# Patient Record
Sex: FEMALE | Race: WHITE | NOT HISPANIC OR LATINO
[De-identification: names, ages, dates, MRNs, and addresses within clinical notes are randomized per-mention and may not be internally consistent; named-entity substitution may affect disease eponyms.]

---

## 2022-07-11 ENCOUNTER — APPOINTMENT (OUTPATIENT)
Dept: ORTHOPEDIC SURGERY | Facility: CLINIC | Age: 87
End: 2022-07-11

## 2022-07-11 VITALS — BODY MASS INDEX: 20.32 KG/M2 | HEIGHT: 72 IN | WEIGHT: 150 LBS

## 2022-07-11 DIAGNOSIS — Z78.9 OTHER SPECIFIED HEALTH STATUS: ICD-10-CM

## 2022-07-11 DIAGNOSIS — Z96.651 PRESENCE OF RIGHT ARTIFICIAL KNEE JOINT: ICD-10-CM

## 2022-07-11 DIAGNOSIS — Z85.3 PERSONAL HISTORY OF MALIGNANT NEOPLASM OF BREAST: ICD-10-CM

## 2022-07-11 DIAGNOSIS — M25.561 PAIN IN RIGHT KNEE: ICD-10-CM

## 2022-07-11 PROBLEM — Z00.00 ENCOUNTER FOR PREVENTIVE HEALTH EXAMINATION: Status: ACTIVE | Noted: 2022-07-11

## 2022-07-11 PROCEDURE — 99204 OFFICE O/P NEW MOD 45 MIN: CPT

## 2022-07-11 PROCEDURE — 73562 X-RAY EXAM OF KNEE 3: CPT | Mod: LT

## 2022-07-11 RX ORDER — AMLODIPINE BESYLATE 2.5 MG/1
2.5 TABLET ORAL
Qty: 90 | Refills: 0 | Status: ACTIVE | COMMUNITY
Start: 2022-02-03

## 2022-07-11 RX ORDER — PRAVASTATIN SODIUM 20 MG/1
20 TABLET ORAL
Qty: 90 | Refills: 0 | Status: ACTIVE | COMMUNITY
Start: 2022-02-03

## 2022-07-11 RX ORDER — DORZOLAMIDE HYDROCHLORIDE 20 MG/ML
2 SOLUTION OPHTHALMIC
Qty: 10 | Refills: 0 | Status: ACTIVE | COMMUNITY
Start: 2021-10-04

## 2022-07-11 RX ORDER — CEPHALEXIN 500 MG/1
500 CAPSULE ORAL
Qty: 21 | Refills: 0 | Status: COMPLETED | COMMUNITY
Start: 2022-06-20

## 2022-07-11 RX ORDER — ANASTROZOLE TABLETS 1 MG/1
1 TABLET ORAL
Qty: 90 | Refills: 0 | Status: ACTIVE | COMMUNITY
Start: 2021-09-16

## 2022-07-11 RX ORDER — DORZOLAMIDE HYDROCHLORIDE 20 MG/ML
2 SOLUTION OPHTHALMIC
Qty: 10 | Refills: 0 | Status: COMPLETED | COMMUNITY
Start: 2021-10-04

## 2022-07-11 NOTE — ASSESSMENT
[FreeTextEntry1] : Options were discussed with patient and patients daughter. The plan at this time is for the patient to continue conservative treatment, rest, ice and OTC medications, along with bracing. Patient will f/u in 4-6 months to get further imaging to further evaluate the prosthesis. \par \par Entered by Darvin Marcus acting as scribe.\par Dr. Sr Attestation\par The documentation recorded by the scribe, in my presence, accurately reflects the service I, Dr. Sr, personally performed, and the decisions made by me with my edits as appropriate.\par

## 2022-07-11 NOTE — DATA REVIEWED
[CT Scan] : CT scan [Left] : left [Knee] : knee [Report was reviewed and noted in the chart] : The report was reviewed and noted in the chart [FreeTextEntry1] : IMPRESSION:\par \par \par Left total knee arthroplasty. There are abnormal regions at femoral and\par patellar component bone interface as described. 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

## 2022-07-11 NOTE — PHYSICAL EXAM
[NL (0)] : extension 0 degrees [5___] : hamstring 5[unfilled]/5 [] : patient ambulates without assistive device [Right] : right knee [AP] : anteroposterior [Lateral] : lateral [[place]] : skyline [Components well fixed, in good position] : Components well fixed, in good position [TWNoteComboBox7] : flexion 110 degrees

## 2022-07-11 NOTE — HISTORY OF PRESENT ILLNESS
[Burning] : burning [Localized] : localized [Constant] : constant [Household chores] : household chores [Leisure] : leisure [Sleep] : sleep [Nothing helps with pain getting better] : Nothing helps with pain getting better [Standing] : standing [Walking] : walking [Stairs] : stairs [Retired] : Work status: retired [de-identified] : Patient presents in office today with LT knee pain. Done LT TKA done w/ [facility] 7/17/17. Knee has been "hot" and painful since surgery. Primary ordered bone scan and CT scan of LT knee.  said he did not notice any issue at last visit 08/13/2019.  Denies pain RX.\par \par IMPRESSION:\par \par Left total knee arthroplasty. There are abnormal regions at femoral and\par patellar component bone interface as described. 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

## 2023-01-19 ENCOUNTER — APPOINTMENT (OUTPATIENT)
Dept: ORTHOPEDIC SURGERY | Facility: CLINIC | Age: 88
End: 2023-01-19

## 2023-05-08 ENCOUNTER — OFFICE (OUTPATIENT)
Dept: URBAN - METROPOLITAN AREA CLINIC 103 | Facility: CLINIC | Age: 88
Setting detail: OPHTHALMOLOGY
End: 2023-05-08
Payer: MEDICARE

## 2023-05-08 DIAGNOSIS — H26.491: ICD-10-CM

## 2023-05-08 DIAGNOSIS — H26.493: ICD-10-CM

## 2023-05-08 DIAGNOSIS — H40.2221: ICD-10-CM

## 2023-05-08 DIAGNOSIS — H40.1112: ICD-10-CM

## 2023-05-08 DIAGNOSIS — H40.2212: ICD-10-CM

## 2023-05-08 DIAGNOSIS — Z96.1: ICD-10-CM

## 2023-05-08 DIAGNOSIS — H26.492: ICD-10-CM

## 2023-05-08 DIAGNOSIS — H40.1122: ICD-10-CM

## 2023-05-08 PROCEDURE — 92250 FUNDUS PHOTOGRAPHY W/I&R: CPT | Performed by: OPHTHALMOLOGY

## 2023-05-08 PROCEDURE — 99214 OFFICE O/P EST MOD 30 MIN: CPT | Performed by: OPHTHALMOLOGY

## 2023-05-08 ASSESSMENT — SPHEQUIV_DERIVED
OS_SPHEQUIV: 2
OD_SPHEQUIV: 0.25
OS_SPHEQUIV: -0.5
OS_SPHEQUIV: -0.5
OD_SPHEQUIV: 0.25
OS_SPHEQUIV: -0.375

## 2023-05-08 ASSESSMENT — VISUAL ACUITY
OS_BCVA: 20/25+1
OD_BCVA: 20/30-2

## 2023-05-08 ASSESSMENT — REFRACTION_MANIFEST
OS_AXIS: 112
OD_SPHERE: +0.50
OS_VA1: 20/30
OS_SPHERE: +2.50
OS_CYLINDER: -1.50
OD_VA1: 20/20
OS_AXIS: 085
OS_CYLINDER: -0.25
OS_VA1: 20/30
OD_AXIS: 092
OS_VA1: 20/30
OD_CYLINDER: -2.50
OS_CYLINDER: -1.00
OD_VA1: 20/25
OD_CYLINDER: SPH
OS_AXIS: 030
OD_VA1: 20/20-
OS_SPHERE: +0.25
OD_SPHERE: +1.50
OD_SPHERE: PLANO
OS_SPHERE: -0.25

## 2023-05-08 ASSESSMENT — TONOMETRY
OS_IOP_MMHG: 16
OS_IOP_MMHG: 14
OD_IOP_MMHG: 14

## 2023-05-08 ASSESSMENT — CONFRONTATIONAL VISUAL FIELD TEST (CVF)
OS_FINDINGS: FULL
OD_FINDINGS: FULL

## 2023-05-08 ASSESSMENT — REFRACTION_CURRENTRX
OS_VPRISM_DIRECTION: BF
OD_CYLINDER: -0.75
OS_SPHERE: +3.00
OS_ADD: +2.50
OD_OVR_VA: 20/
OD_VPRISM_DIRECTION: BF
OD_SPHERE: +2.50
OS_CYLINDER: -0.75
OS_AXIS: 097
OD_AXIS: 075
OD_ADD: +2.50
OS_OVR_VA: 20/

## 2023-05-08 ASSESSMENT — KERATOMETRY
OS_K1POWER_DIOPTERS: 43.75
OS_K2POWER_DIOPTERS: 44.50
OD_K2POWER_DIOPTERS: 45.00
OD_AXISANGLE_DEGREES: 009
OS_AXISANGLE_DEGREES: 020
OD_K1POWER_DIOPTERS: 43.75

## 2023-05-08 ASSESSMENT — REFRACTION_AUTOREFRACTION
OS_CYLINDER: -1.50
OS_AXIS: 085
OD_CYLINDER: -2.50
OD_SPHERE: +1.50
OS_SPHERE: +0.25
OD_AXIS: 092

## 2023-05-08 ASSESSMENT — AXIALLENGTH_DERIVED
OD_AL: 23.1804
OD_AL: 23.1804
OS_AL: 23.5087
OS_AL: 22.6245
OS_AL: 23.5572
OS_AL: 23.5572

## 2023-05-08 ASSESSMENT — PACHYMETRY
OS_CT_CORRECTION: -3
OD_CT_CORRECTION: -4
OD_CT_UM: 591
OS_CT_UM: 586

## 2023-05-26 ENCOUNTER — ASC (OUTPATIENT)
Dept: URBAN - METROPOLITAN AREA SURGERY 8 | Facility: SURGERY | Age: 88
Setting detail: OPHTHALMOLOGY
End: 2023-05-26
Payer: MEDICARE

## 2023-05-26 DIAGNOSIS — H26.491: ICD-10-CM

## 2023-05-26 PROCEDURE — 66821 AFTER CATARACT LASER SURGERY: CPT | Performed by: OPHTHALMOLOGY

## 2023-05-26 ASSESSMENT — REFRACTION_CURRENTRX
OS_ADD: +2.50
OD_ADD: +2.50
OD_SPHERE: +2.50
OS_SPHERE: +3.00
OD_CYLINDER: -0.75
OS_OVR_VA: 20/
OD_AXIS: 075
OS_AXIS: 097
OS_VPRISM_DIRECTION: BF
OS_CYLINDER: -0.75
OD_OVR_VA: 20/
OD_VPRISM_DIRECTION: BF

## 2023-05-26 ASSESSMENT — AXIALLENGTH_DERIVED
OS_AL: 23.5572
OS_AL: 23.5087
OS_AL: 22.6245
OS_AL: 23.5572
OD_AL: 23.1804
OD_AL: 23.1804

## 2023-05-26 ASSESSMENT — SPHEQUIV_DERIVED
OS_SPHEQUIV: -0.5
OD_SPHEQUIV: 0.25
OS_SPHEQUIV: -0.375
OS_SPHEQUIV: 2
OD_SPHEQUIV: 0.25
OS_SPHEQUIV: -0.5

## 2023-05-26 ASSESSMENT — KERATOMETRY
OD_AXISANGLE_DEGREES: 009
OD_K2POWER_DIOPTERS: 45.00
OS_AXISANGLE_DEGREES: 020
OS_K1POWER_DIOPTERS: 43.75
OD_K1POWER_DIOPTERS: 43.75
OS_K2POWER_DIOPTERS: 44.50

## 2023-05-26 ASSESSMENT — REFRACTION_MANIFEST
OD_AXIS: 092
OS_VA1: 20/30
OS_SPHERE: -0.25
OS_SPHERE: +2.50
OS_SPHERE: +0.25
OS_CYLINDER: -1.00
OD_VA1: 20/20-
OD_CYLINDER: -2.50
OS_AXIS: 030
OS_CYLINDER: -0.25
OD_VA1: 20/20
OD_VA1: 20/25
OS_CYLINDER: -1.50
OD_SPHERE: PLANO
OS_AXIS: 112
OD_CYLINDER: SPH
OS_AXIS: 085
OD_SPHERE: +1.50
OD_SPHERE: +0.50

## 2023-05-26 ASSESSMENT — REFRACTION_AUTOREFRACTION
OS_AXIS: 085
OD_AXIS: 092
OS_SPHERE: +0.25
OD_CYLINDER: -2.50
OS_CYLINDER: -1.50
OD_SPHERE: +1.50

## 2023-05-26 ASSESSMENT — VISUAL ACUITY
OD_BCVA: 20/30-2
OS_BCVA: 20/25+1

## 2023-06-02 ENCOUNTER — ASC (OUTPATIENT)
Dept: URBAN - METROPOLITAN AREA SURGERY 8 | Facility: SURGERY | Age: 88
Setting detail: OPHTHALMOLOGY
End: 2023-06-02
Payer: MEDICARE

## 2023-06-02 DIAGNOSIS — H26.492: ICD-10-CM

## 2023-06-02 PROCEDURE — 66821 AFTER CATARACT LASER SURGERY: CPT | Performed by: OPHTHALMOLOGY

## 2023-06-02 ASSESSMENT — REFRACTION_CURRENTRX
OD_VPRISM_DIRECTION: BF
OD_AXIS: 075
OS_ADD: +2.50
OD_SPHERE: +2.50
OD_CYLINDER: -0.75
OS_AXIS: 097
OS_CYLINDER: -0.75
OS_SPHERE: +3.00
OS_OVR_VA: 20/
OD_OVR_VA: 20/
OD_ADD: +2.50
OS_VPRISM_DIRECTION: BF

## 2023-06-02 ASSESSMENT — REFRACTION_AUTOREFRACTION
OS_AXIS: 085
OD_SPHERE: +1.50
OD_CYLINDER: -2.50
OS_SPHERE: +0.25
OS_CYLINDER: -1.50
OD_AXIS: 092

## 2023-06-02 ASSESSMENT — REFRACTION_MANIFEST
OS_VA1: 20/30
OS_CYLINDER: -0.25
OS_AXIS: 030
OD_CYLINDER: SPH
OS_VA1: 20/30
OD_VA1: 20/25
OS_AXIS: 085
OD_SPHERE: PLANO
OD_AXIS: 092
OS_SPHERE: +0.25
OD_SPHERE: +1.50
OD_VA1: 20/20
OS_CYLINDER: -1.50
OS_SPHERE: -0.25
OD_SPHERE: +0.50
OS_AXIS: 112
OS_VA1: 20/30
OD_VA1: 20/20-
OS_SPHERE: +2.50
OD_CYLINDER: -2.50
OS_CYLINDER: -1.00

## 2023-06-02 ASSESSMENT — AXIALLENGTH_DERIVED
OD_AL: 23.1804
OD_AL: 23.1804
OS_AL: 23.5087
OS_AL: 22.6245
OS_AL: 23.5572
OS_AL: 23.5572

## 2023-06-02 ASSESSMENT — SPHEQUIV_DERIVED
OD_SPHEQUIV: 0.25
OS_SPHEQUIV: -0.5
OD_SPHEQUIV: 0.25
OS_SPHEQUIV: -0.5
OS_SPHEQUIV: -0.375
OS_SPHEQUIV: 2

## 2023-06-02 ASSESSMENT — KERATOMETRY
OD_K2POWER_DIOPTERS: 45.00
OS_K2POWER_DIOPTERS: 44.50
OS_AXISANGLE_DEGREES: 020
OD_K1POWER_DIOPTERS: 43.75
OS_K1POWER_DIOPTERS: 43.75
OD_AXISANGLE_DEGREES: 009

## 2023-06-02 ASSESSMENT — VISUAL ACUITY
OS_BCVA: 20/25+1
OD_BCVA: 20/30-2

## 2023-07-10 ENCOUNTER — OFFICE (OUTPATIENT)
Dept: URBAN - METROPOLITAN AREA CLINIC 103 | Facility: CLINIC | Age: 88
Setting detail: OPHTHALMOLOGY
End: 2023-07-10
Payer: MEDICARE

## 2023-07-10 DIAGNOSIS — H40.033: ICD-10-CM

## 2023-07-10 DIAGNOSIS — H40.2221: ICD-10-CM

## 2023-07-10 DIAGNOSIS — Z96.1: ICD-10-CM

## 2023-07-10 DIAGNOSIS — H40.1122: ICD-10-CM

## 2023-07-10 DIAGNOSIS — H40.2212: ICD-10-CM

## 2023-07-10 DIAGNOSIS — H40.1112: ICD-10-CM

## 2023-07-10 PROCEDURE — 99024 POSTOP FOLLOW-UP VISIT: CPT | Performed by: OPHTHALMOLOGY

## 2023-07-10 ASSESSMENT — PACHYMETRY
OD_CT_CORRECTION: -4
OS_CT_UM: 586
OS_CT_CORRECTION: -3
OD_CT_UM: 591

## 2023-07-10 ASSESSMENT — REFRACTION_AUTOREFRACTION
OS_AXIS: 096
OD_CYLINDER: -2.50
OD_SPHERE: +1.25
OD_AXIS: 094
OS_SPHERE: -0.50
OS_CYLINDER: -1.00

## 2023-07-10 ASSESSMENT — KERATOMETRY
OS_AXISANGLE_DEGREES: 057
OS_K2POWER_DIOPTERS: 44.50
OD_K1POWER_DIOPTERS: 44.00
OD_K2POWER_DIOPTERS: 44.75
OS_K1POWER_DIOPTERS: 44.00
OD_AXISANGLE_DEGREES: 004

## 2023-07-10 ASSESSMENT — REFRACTION_MANIFEST
OS_AXIS: 085
OS_AXIS: 112
OS_SPHERE: -0.25
OD_VA1: 20/20
OD_SPHERE: +1.50
OS_CYLINDER: -0.25
OD_AXIS: 092
OD_SPHERE: +0.50
OS_CYLINDER: -1.50
OD_CYLINDER: SPH
OS_CYLINDER: -1.00
OS_AXIS: 030
OD_SPHERE: PLANO
OS_VA1: 20/30
OD_VA1: 20/25
OS_SPHERE: +0.25
OS_VA1: 20/30
OS_SPHERE: +2.50
OS_VA1: 20/30
OD_VA1: 20/20-
OD_CYLINDER: -2.50

## 2023-07-10 ASSESSMENT — REFRACTION_CURRENTRX
OD_ADD: +2.50
OD_VPRISM_DIRECTION: BF
OD_SPHERE: +2.50
OD_AXIS: 075
OS_CYLINDER: -0.75
OS_ADD: +2.50
OS_OVR_VA: 20/
OD_CYLINDER: -0.75
OS_SPHERE: +3.00
OS_AXIS: 097
OD_OVR_VA: 20/
OS_VPRISM_DIRECTION: BF

## 2023-07-10 ASSESSMENT — CONFRONTATIONAL VISUAL FIELD TEST (CVF)
OD_FINDINGS: FULL
OS_FINDINGS: FULL

## 2023-07-10 ASSESSMENT — SPHEQUIV_DERIVED
OS_SPHEQUIV: 2
OS_SPHEQUIV: -0.375
OS_SPHEQUIV: -1
OD_SPHEQUIV: 0.25
OD_SPHEQUIV: 0
OS_SPHEQUIV: -0.5

## 2023-07-10 ASSESSMENT — TONOMETRY
OS_IOP_MMHG: 14
OD_IOP_MMHG: 15
OD_IOP_MMHG: 14
OS_IOP_MMHG: 14

## 2023-07-10 ASSESSMENT — VISUAL ACUITY
OS_BCVA: 20/25
OD_BCVA: 20/30

## 2023-07-10 ASSESSMENT — AXIALLENGTH_DERIVED
OD_AL: 23.1804
OD_AL: 23.2748
OS_AL: 23.7066
OS_AL: 23.5115
OS_AL: 23.4632
OS_AL: 22.5823

## 2023-10-09 ENCOUNTER — OFFICE (OUTPATIENT)
Dept: URBAN - METROPOLITAN AREA CLINIC 103 | Facility: CLINIC | Age: 88
Setting detail: OPHTHALMOLOGY
End: 2023-10-09
Payer: MEDICARE

## 2023-10-09 DIAGNOSIS — H40.1112: ICD-10-CM

## 2023-10-09 DIAGNOSIS — H40.2221: ICD-10-CM

## 2023-10-09 DIAGNOSIS — H40.2212: ICD-10-CM

## 2023-10-09 DIAGNOSIS — Z96.1: ICD-10-CM

## 2023-10-09 DIAGNOSIS — H40.1122: ICD-10-CM

## 2023-10-09 PROCEDURE — 92012 INTRM OPH EXAM EST PATIENT: CPT | Performed by: OPHTHALMOLOGY

## 2023-10-09 PROCEDURE — 92133 CPTRZD OPH DX IMG PST SGM ON: CPT | Performed by: OPHTHALMOLOGY

## 2023-10-09 PROCEDURE — 92020 GONIOSCOPY: CPT | Performed by: OPHTHALMOLOGY

## 2023-10-09 ASSESSMENT — REFRACTION_MANIFEST
OS_CYLINDER: -0.25
OS_AXIS: 085
OD_CYLINDER: SPH
OS_SPHERE: +0.25
OD_VA1: 20/20-
OD_CYLINDER: -2.50
OS_AXIS: 030
OD_SPHERE: +1.50
OD_AXIS: 092
OS_VA1: 20/30
OS_CYLINDER: -1.00
OD_VA1: 20/20
OD_SPHERE: PLANO
OS_VA1: 20/30
OS_SPHERE: -0.25
OD_SPHERE: +0.50
OS_AXIS: 112
OS_SPHERE: +2.50
OS_VA1: 20/30
OS_CYLINDER: -1.50
OD_VA1: 20/25

## 2023-10-09 ASSESSMENT — REFRACTION_AUTOREFRACTION
OD_CYLINDER: -2.50
OS_CYLINDER: -1.00
OD_AXIS: 094
OS_AXIS: 096
OD_SPHERE: +1.25
OS_SPHERE: -0.50

## 2023-10-09 ASSESSMENT — SPHEQUIV_DERIVED
OS_SPHEQUIV: -0.5
OS_SPHEQUIV: 2
OS_SPHEQUIV: -1
OD_SPHEQUIV: 0
OS_SPHEQUIV: -0.375
OD_SPHEQUIV: 0.25

## 2023-10-09 ASSESSMENT — REFRACTION_CURRENTRX
OD_VPRISM_DIRECTION: BF
OD_SPHERE: +2.50
OD_OVR_VA: 20/
OS_ADD: +2.50
OS_AXIS: 097
OS_CYLINDER: -0.75
OS_OVR_VA: 20/
OD_CYLINDER: -0.75
OS_VPRISM_DIRECTION: BF
OS_SPHERE: +3.00
OD_ADD: +2.50
OD_AXIS: 075

## 2023-10-09 ASSESSMENT — CONFRONTATIONAL VISUAL FIELD TEST (CVF)
OD_FINDINGS: FULL
OS_FINDINGS: FULL

## 2023-10-09 ASSESSMENT — KERATOMETRY
OD_K1POWER_DIOPTERS: 44.00
OD_K2POWER_DIOPTERS: 44.75
OS_K1POWER_DIOPTERS: 44.00
OS_AXISANGLE_DEGREES: 057
OD_AXISANGLE_DEGREES: 004
OS_K2POWER_DIOPTERS: 44.50

## 2023-10-09 ASSESSMENT — VISUAL ACUITY
OD_BCVA: 20/30
OS_BCVA: 20/30

## 2023-10-09 ASSESSMENT — AXIALLENGTH_DERIVED
OS_AL: 22.5823
OS_AL: 23.7066
OS_AL: 23.5115
OD_AL: 23.1804
OS_AL: 23.4632
OD_AL: 23.2748

## 2023-10-09 ASSESSMENT — PACHYMETRY
OS_CT_CORRECTION: -3
OS_CT_UM: 586
OD_CT_UM: 591
OD_CT_CORRECTION: -4

## 2023-10-09 ASSESSMENT — TONOMETRY
OD_IOP_MMHG: 12
OS_IOP_MMHG: 12

## 2025-01-17 ENCOUNTER — APPOINTMENT (OUTPATIENT)
Dept: ORTHOPEDIC SURGERY | Facility: CLINIC | Age: 89
End: 2025-01-17

## 2025-01-17 VITALS — BODY MASS INDEX: 20.32 KG/M2 | HEIGHT: 72 IN | WEIGHT: 150 LBS

## 2025-01-17 DIAGNOSIS — M41.50 OTHER SECONDARY SCOLIOSIS, SITE UNSPECIFIED: ICD-10-CM

## 2025-01-17 DIAGNOSIS — Z96.651 PAIN IN RIGHT KNEE: ICD-10-CM

## 2025-01-17 DIAGNOSIS — M25.561 PAIN IN RIGHT KNEE: ICD-10-CM

## 2025-01-17 DIAGNOSIS — G89.29 PAIN IN RIGHT KNEE: ICD-10-CM

## 2025-01-17 PROCEDURE — 99213 OFFICE O/P EST LOW 20 MIN: CPT

## 2025-01-23 ENCOUNTER — APPOINTMENT (OUTPATIENT)
Dept: ORTHOPEDIC SURGERY | Facility: CLINIC | Age: 89
End: 2025-01-23
Payer: MEDICARE

## 2025-01-23 DIAGNOSIS — Z96.651 PRESENCE OF RIGHT ARTIFICIAL KNEE JOINT: ICD-10-CM

## 2025-01-23 PROCEDURE — 99213 OFFICE O/P EST LOW 20 MIN: CPT

## 2025-01-23 RX ORDER — LIDOCAINE 5% 700 MG/1
5 PATCH TOPICAL
Qty: 30 | Refills: 2 | Status: ACTIVE | COMMUNITY
Start: 2025-01-23 | End: 1900-01-01

## 2025-01-25 PROBLEM — M25.561 ACUTE PAIN OF RIGHT KNEE: Noted: 2022-07-11

## 2025-01-25 PROBLEM — M41.50 DEGENERATIVE SCOLIOSIS: Status: ACTIVE | Noted: 2025-01-25

## 2025-01-25 PROBLEM — M25.561 CHRONIC KNEE PAIN AFTER TOTAL REPLACEMENT OF RIGHT KNEE JOINT: Status: ACTIVE | Noted: 2025-01-25
